# Patient Record
Sex: FEMALE | Employment: FULL TIME | URBAN - METROPOLITAN AREA
[De-identification: names, ages, dates, MRNs, and addresses within clinical notes are randomized per-mention and may not be internally consistent; named-entity substitution may affect disease eponyms.]

---

## 2024-07-16 ENCOUNTER — HOSPITAL ENCOUNTER (EMERGENCY)
Facility: HOSPITAL | Age: 19
Discharge: HOME | End: 2024-07-16
Attending: STUDENT IN AN ORGANIZED HEALTH CARE EDUCATION/TRAINING PROGRAM
Payer: COMMERCIAL

## 2024-07-16 VITALS
SYSTOLIC BLOOD PRESSURE: 107 MMHG | WEIGHT: 124 LBS | BODY MASS INDEX: 23.41 KG/M2 | HEART RATE: 105 BPM | HEIGHT: 61 IN | OXYGEN SATURATION: 100 % | TEMPERATURE: 98 F | DIASTOLIC BLOOD PRESSURE: 70 MMHG | RESPIRATION RATE: 17 BRPM

## 2024-07-16 DIAGNOSIS — O20.8 SUBCHORIONIC HEMORRHAGE OF PLACENTA IN FIRST TRIMESTER: ICD-10-CM

## 2024-07-16 DIAGNOSIS — R10.9 ABDOMINAL PAIN DURING PREGNANCY IN FIRST TRIMESTER: Primary | ICD-10-CM

## 2024-07-16 DIAGNOSIS — O26.891 ABDOMINAL PAIN DURING PREGNANCY IN FIRST TRIMESTER: Primary | ICD-10-CM

## 2024-07-16 LAB
ALBUMIN SERPL-MCNC: 4.2 G/DL (ref 3.5–5)
ALBUMIN/GLOB SERPL: 1.4 RATIO (ref 1.1–2)
ALP SERPL-CCNC: 61 UNIT/L (ref 40–150)
ALT SERPL-CCNC: 9 UNIT/L (ref 0–55)
ANION GAP SERPL CALC-SCNC: 11 MEQ/L
AST SERPL-CCNC: 18 UNIT/L (ref 5–34)
B-HCG FREE SERPL-ACNC: ABNORMAL MIU/ML
B-HCG UR QL: POSITIVE
BACTERIA #/AREA URNS AUTO: ABNORMAL /HPF
BASOPHILS # BLD AUTO: 0.02 X10(3)/MCL
BASOPHILS NFR BLD AUTO: 0.2 %
BILIRUB SERPL-MCNC: 0.3 MG/DL
BILIRUB UR QL STRIP.AUTO: NEGATIVE
BUN SERPL-MCNC: 7.9 MG/DL (ref 7–18.7)
CALCIUM SERPL-MCNC: 9.7 MG/DL (ref 8.4–10.2)
CHLORIDE SERPL-SCNC: 111 MMOL/L (ref 98–107)
CLARITY UR: CLEAR
CO2 SERPL-SCNC: 18 MMOL/L (ref 22–29)
COLOR UR AUTO: YELLOW
CREAT SERPL-MCNC: 0.64 MG/DL (ref 0.55–1.02)
CREAT/UREA NIT SERPL: 12
EOSINOPHIL # BLD AUTO: 0.1 X10(3)/MCL (ref 0–0.9)
EOSINOPHIL NFR BLD AUTO: 1.1 %
ERYTHROCYTE [DISTWIDTH] IN BLOOD BY AUTOMATED COUNT: 11.8 % (ref 11.5–17)
GFR SERPLBLD CREATININE-BSD FMLA CKD-EPI: >60 ML/MIN/1.73/M2
GLOBULIN SER-MCNC: 3 GM/DL (ref 2.4–3.5)
GLUCOSE SERPL-MCNC: 85 MG/DL (ref 74–100)
GLUCOSE UR QL STRIP: NORMAL
HCT VFR BLD AUTO: 42.4 % (ref 37–47)
HGB BLD-MCNC: 15 G/DL (ref 12–16)
HGB UR QL STRIP: NEGATIVE
IMM GRANULOCYTES # BLD AUTO: 0.03 X10(3)/MCL (ref 0–0.04)
IMM GRANULOCYTES NFR BLD AUTO: 0.3 %
KETONES UR QL STRIP: NEGATIVE
LEUKOCYTE ESTERASE UR QL STRIP: NEGATIVE
LYMPHOCYTES # BLD AUTO: 1.72 X10(3)/MCL (ref 0.6–4.6)
LYMPHOCYTES NFR BLD AUTO: 18.8 %
MCH RBC QN AUTO: 33.3 PG (ref 27–31)
MCHC RBC AUTO-ENTMCNC: 35.4 G/DL (ref 33–36)
MCV RBC AUTO: 94 FL (ref 80–94)
MONOCYTES # BLD AUTO: 0.73 X10(3)/MCL (ref 0.1–1.3)
MONOCYTES NFR BLD AUTO: 8 %
NEUTROPHILS # BLD AUTO: 6.56 X10(3)/MCL (ref 2.1–9.2)
NEUTROPHILS NFR BLD AUTO: 71.6 %
NITRITE UR QL STRIP: NEGATIVE
NRBC BLD AUTO-RTO: 0 %
PH UR STRIP: 5.5 [PH]
PLATELET # BLD AUTO: 220 X10(3)/MCL (ref 130–400)
PMV BLD AUTO: 10 FL (ref 7.4–10.4)
POTASSIUM SERPL-SCNC: 3.9 MMOL/L (ref 3.5–5.1)
PROT SERPL-MCNC: 7.2 GM/DL (ref 6.4–8.3)
PROT UR QL STRIP: ABNORMAL
RBC # BLD AUTO: 4.51 X10(6)/MCL (ref 4.2–5.4)
RBC #/AREA URNS AUTO: ABNORMAL /HPF
SODIUM SERPL-SCNC: 140 MMOL/L (ref 136–145)
SP GR UR STRIP.AUTO: 1.03 (ref 1–1.03)
SQUAMOUS #/AREA URNS LPF: ABNORMAL /HPF
UROBILINOGEN UR STRIP-ACNC: NORMAL
WBC # BLD AUTO: 9.16 X10(3)/MCL (ref 4.5–11.5)
WBC #/AREA URNS AUTO: ABNORMAL /HPF

## 2024-07-16 PROCEDURE — 99284 EMERGENCY DEPT VISIT MOD MDM: CPT | Mod: 25

## 2024-07-16 PROCEDURE — 85025 COMPLETE CBC W/AUTO DIFF WBC: CPT | Performed by: NURSE PRACTITIONER

## 2024-07-16 PROCEDURE — 81003 URINALYSIS AUTO W/O SCOPE: CPT | Performed by: NURSE PRACTITIONER

## 2024-07-16 PROCEDURE — 80053 COMPREHEN METABOLIC PANEL: CPT | Performed by: NURSE PRACTITIONER

## 2024-07-16 PROCEDURE — 81025 URINE PREGNANCY TEST: CPT | Performed by: NURSE PRACTITIONER

## 2024-07-16 PROCEDURE — 81001 URINALYSIS AUTO W/SCOPE: CPT | Performed by: NURSE PRACTITIONER

## 2024-07-16 PROCEDURE — 84702 CHORIONIC GONADOTROPIN TEST: CPT | Performed by: NURSE PRACTITIONER

## 2024-07-16 NOTE — DISCHARGE INSTRUCTIONS
Tylenol 1000mg every 6 hours for pain if needed. Lots of hydration. Keep appointment next week with obgyn. Return to ER if symptoms change/worsen

## 2024-07-16 NOTE — FIRST PROVIDER EVALUATION
"Medical screening examination initiated.  I have conducted a focused provider triage encounter, findings are as follows:    Brief history of present illness:  20 y/o female presents with being approx 7 weeks gestation. . Doesn't have ob yet but does have appt with one but unable to recall name. Having low abdominal pain. Has "lump" on skin and that is where pain is. No vaginal bleeding.     Vitals:    24 1327   BP: 111/75   BP Location: Left arm   Pulse: 100   Resp: 18   Temp: 98.3 °F (36.8 °C)   TempSrc: Oral   SpO2: 98%   Weight: 56.2 kg (124 lb)   Height: 5' 1" (1.549 m)       Pertinent physical exam:  alert, nonlabored, left lower pelvic region has quetsionable raised soft tissue where she is tender that isn't red    Brief workup plan:  labs, urine    Preliminary workup initiated; this workup will be continued and followed by the physician or advanced practice provider that is assigned to the patient when roomed.  "

## 2024-07-19 NOTE — ED PROVIDER NOTES
"Encounter Date: 7/16/2024       History     Chief Complaint   Patient presents with    Abdominal Pain     Pt arrives POV c/o abdominal pain, 7 weeks pregnant. Pt states a "lump" formed on LLQ where the tenderness is. Denies vaginal bleeding.      See MDM    The history is provided by the patient. No  was used.     Review of patient's allergies indicates:   Allergen Reactions    Azithromycin     Ketorolac     Penicillins      No past medical history on file.  No past surgical history on file.  No family history on file.     Review of Systems   Gastrointestinal:  Positive for abdominal pain.   Genitourinary:  Negative for vaginal bleeding.   Skin:         Lump on skin lower pelvic    All other systems reviewed and are negative.      Physical Exam     Initial Vitals [07/16/24 1327]   BP Pulse Resp Temp SpO2   111/75 100 18 98.3 °F (36.8 °C) 98 %      MAP       --         Physical Exam    Nursing note and vitals reviewed.  Constitutional: She appears well-developed and well-nourished.   Cardiovascular:  Normal rate, regular rhythm and normal heart sounds.           Pulmonary/Chest: Breath sounds normal. No respiratory distress.   Abdominal: Abdomen is soft. Bowel sounds are normal. She exhibits no distension. There is abdominal tenderness (mild very low/pelvic tenderness).       Genitourinary:    Genitourinary Comments: Exam deferred      Musculoskeletal:         General: Normal range of motion.     Neurological: She is alert and oriented to person, place, and time.   Skin: Skin is warm and dry.   Psychiatric: She has a normal mood and affect.         ED Course   Procedures  Labs Reviewed   CBC WITH DIFFERENTIAL - Abnormal; Notable for the following components:       Result Value    MCH 33.3 (*)     All other components within normal limits   COMPREHENSIVE METABOLIC PANEL - Abnormal; Notable for the following components:    Chloride 111 (*)     CO2 18 (*)     All other components within normal limits "   URINALYSIS, REFLEX TO URINE CULTURE - Abnormal; Notable for the following components:    Specific Gravity, UA 1.032 (*)     Protein, UA Trace (*)     All other components within normal limits   PREGNANCY TEST, URINE RAPID - Abnormal; Notable for the following components:    hCG Qualitative, Urine Positive (*)     All other components within normal limits   HCG, QUANTITATIVE - Abnormal; Notable for the following components:    Beta HCG Quant 60,850.14 (*)     All other components within normal limits          Imaging Results              US OB <14 Wks, TransAbd, Single Gestation (Final result)  Result time 07/16/24 16:13:47   Procedure changed from US OB <14 Wks TransAbd & TransVag, Single Gestation (XPD)     Final result by Lito Mcdermott MD (07/16/24 16:13:47)                   Narrative:    EXAMINATION  US OB <14 WEEKS TRANSABDOM, SINGLE GESTATION    CLINICAL HISTORY  *abdominal pain;  *LMP: 20 May 2024 (8 weeks, 1 day)    TECHNIQUE  Multiplanar grayscale sonographic evaluation of the uterus and adjacent pelvic structures was performed, with limited Doppler interrogation.    COMPARISON  None available for this gestation, at the time of initial interpretation.    FINDINGS  Exam quality: adequate for evaluation    Single gestational sac is appreciated within the uterine cavity, with readily visualized yolk sac and fetal pole.  There is adjacent focal hypoechogenicity measuring up to 1.4 cm, consistent with subchorionic hemorrhage.  No associated mass effect upon the gestational sac contour is identified.    The mean sac diameter is 1.89 cm, with average crown-rump length of 1.07 cm; corresponding biometric estimated gestational age 7 weeks, 0 days with PAMELA of 4 March 2025.  Fetal cardiac activity is present, heart rate 155 bpm.    No suspicious adnexal lesion is identified.  Circumscribed anechoic structures with through acoustic enhancement are noted at the right ovary measuring up to 2.8 cm and compatible with  "dominant follicle or other benign/physiologic etiology.  Doppler interrogation of the right ovary demonstrates normal low-resistance arterial waveforms and maintained venous outflow.  The left ovary is not convincingly identified.    There is no abnormal free fluid within the pelvis.    IMPRESSION  1. Single live intrauterine gestation, estimated age 7 weeks, 0 days by biometry; PAMELA 2025.  2. Suspected area of small subchorionic hemorrhage without definite sonographic features of active bleeding.  3. No evidence of acute or suspicious focal adnexal abnormality.      Electronically signed by: Lito Mcdermott  Date:    2024  Time:    16:13                                     Medications - No data to display  Medical Decision Making  20 y/o  pregnant female presents with being around 7 weeks gestation she believes and having low abdominal cramping for the last week or so and now has some type of lump to her lower abdomen/pelvic region that hurts too. No vaginal bleeding. She hasn't seen ob yet but does have appt scheduled. No dysuria. No vomiting. No fever    Labs unremarkable for acute findings. US shows IUP 7w with 150's fhr. Small subchorionic hemorrhage.     Will plan to discharge. She had ob appt scheduled. The "lump" is not concerning at thist jermain. Not red, not large, does not appear to be abscess. Continue to monitor. Tylenol for discomfort     Amount and/or Complexity of Data Reviewed  Labs: ordered. Decision-making details documented in ED Course.  Radiology: ordered. Decision-making details documented in ED Course.                                      Clinical Impression:  Final diagnoses:  [O26.891, R10.9] Abdominal pain during pregnancy in first trimester (Primary)  [O20.8] Subchorionic hemorrhage of placenta in first trimester          ED Disposition Condition    Discharge Stable          ED Prescriptions    None       Follow-up Information       Follow up With Specialties Details Why " Contact Info    Lisa Barrios MD Obstetrics and Gynecology  keep appointment next week [address] [place] [address]  [address]  [facility] [address]  [phone]               Berkley Mcpherson, [facility]  07/19/24 [id number]